# Patient Record
Sex: MALE | Race: WHITE | NOT HISPANIC OR LATINO | Employment: OTHER | URBAN - METROPOLITAN AREA
[De-identification: names, ages, dates, MRNs, and addresses within clinical notes are randomized per-mention and may not be internally consistent; named-entity substitution may affect disease eponyms.]

---

## 2018-05-16 ENCOUNTER — HOSPITAL ENCOUNTER (OUTPATIENT)
Dept: RADIOLOGY | Facility: MEDICAL CENTER | Age: 58
End: 2018-05-16
Attending: PHYSICIAN ASSISTANT
Payer: MEDICARE

## 2018-05-16 DIAGNOSIS — M54.6 PAIN IN THORACIC SPINE: ICD-10-CM

## 2018-05-16 DIAGNOSIS — M43.22 FUSION OF SPINE OF CERVICAL REGION: ICD-10-CM

## 2018-05-16 PROCEDURE — 72146 MRI CHEST SPINE W/O DYE: CPT

## 2018-07-19 ENCOUNTER — HOSPITAL ENCOUNTER (OUTPATIENT)
Facility: MEDICAL CENTER | Age: 58
End: 2018-07-19
Attending: NEUROLOGICAL SURGERY | Admitting: NEUROLOGICAL SURGERY
Payer: MEDICARE

## 2018-08-06 ENCOUNTER — HOSPITAL ENCOUNTER (OUTPATIENT)
Dept: RADIOLOGY | Facility: MEDICAL CENTER | Age: 58
End: 2018-08-06
Attending: NEUROLOGICAL SURGERY
Payer: MEDICARE

## 2018-08-06 VITALS — HEIGHT: 70 IN | WEIGHT: 234.57 LBS | BODY MASS INDEX: 33.58 KG/M2

## 2018-08-06 DIAGNOSIS — M51.36 DEGENERATION OF LUMBAR INTERVERTEBRAL DISC: ICD-10-CM

## 2018-08-06 DIAGNOSIS — Z01.810 PRE-OPERATIVE CARDIOVASCULAR EXAMINATION: ICD-10-CM

## 2018-08-06 DIAGNOSIS — Z01.812 PRE-OPERATIVE LABORATORY EXAMINATION: ICD-10-CM

## 2018-08-06 LAB
ANION GAP SERPL CALC-SCNC: 11 MMOL/L (ref 0–11.9)
APPEARANCE UR: CLEAR
APTT PPP: 24.8 SEC (ref 24.7–36)
BASOPHILS # BLD AUTO: 0.7 % (ref 0–1.8)
BASOPHILS # BLD: 0.04 K/UL (ref 0–0.12)
BILIRUB UR QL STRIP.AUTO: NEGATIVE
BUN SERPL-MCNC: 13 MG/DL (ref 8–22)
CALCIUM SERPL-MCNC: 9.7 MG/DL (ref 8.5–10.5)
CHLORIDE SERPL-SCNC: 105 MMOL/L (ref 96–112)
CO2 SERPL-SCNC: 26 MMOL/L (ref 20–33)
COLOR UR: YELLOW
CREAT SERPL-MCNC: 0.95 MG/DL (ref 0.5–1.4)
EKG IMPRESSION: NORMAL
EOSINOPHIL # BLD AUTO: 0.46 K/UL (ref 0–0.51)
EOSINOPHIL NFR BLD: 8.3 % (ref 0–6.9)
ERYTHROCYTE [DISTWIDTH] IN BLOOD BY AUTOMATED COUNT: 54.2 FL (ref 35.9–50)
EST. AVERAGE GLUCOSE BLD GHB EST-MCNC: 143 MG/DL
GLUCOSE SERPL-MCNC: 144 MG/DL (ref 65–99)
GLUCOSE UR STRIP.AUTO-MCNC: NEGATIVE MG/DL
HBA1C MFR BLD: 6.6 % (ref 0–5.6)
HCT VFR BLD AUTO: 42 % (ref 42–52)
HGB BLD-MCNC: 13.5 G/DL (ref 14–18)
IMM GRANULOCYTES # BLD AUTO: 0.01 K/UL (ref 0–0.11)
IMM GRANULOCYTES NFR BLD AUTO: 0.2 % (ref 0–0.9)
INR PPP: 0.98 (ref 0.87–1.13)
KETONES UR STRIP.AUTO-MCNC: NEGATIVE MG/DL
LEUKOCYTE ESTERASE UR QL STRIP.AUTO: NEGATIVE
LYMPHOCYTES # BLD AUTO: 1.51 K/UL (ref 1–4.8)
LYMPHOCYTES NFR BLD: 27.2 % (ref 22–41)
MCH RBC QN AUTO: 27.3 PG (ref 27–33)
MCHC RBC AUTO-ENTMCNC: 32.1 G/DL (ref 33.7–35.3)
MCV RBC AUTO: 84.8 FL (ref 81.4–97.8)
MICRO URNS: NORMAL
MONOCYTES # BLD AUTO: 0.53 K/UL (ref 0–0.85)
MONOCYTES NFR BLD AUTO: 9.5 % (ref 0–13.4)
NEUTROPHILS # BLD AUTO: 3.01 K/UL (ref 1.82–7.42)
NEUTROPHILS NFR BLD: 54.1 % (ref 44–72)
NITRITE UR QL STRIP.AUTO: NEGATIVE
NRBC # BLD AUTO: 0 K/UL
NRBC BLD-RTO: 0 /100 WBC
PH UR STRIP.AUTO: 6 [PH]
PLATELET # BLD AUTO: 188 K/UL (ref 164–446)
PMV BLD AUTO: 9.6 FL (ref 9–12.9)
POTASSIUM SERPL-SCNC: 4.6 MMOL/L (ref 3.6–5.5)
PROT UR QL STRIP: NEGATIVE MG/DL
PROTHROMBIN TIME: 12.7 SEC (ref 12–14.6)
RBC # BLD AUTO: 4.95 M/UL (ref 4.7–6.1)
RBC UR QL AUTO: NEGATIVE
SODIUM SERPL-SCNC: 142 MMOL/L (ref 135–145)
SP GR UR STRIP.AUTO: 1.02
UROBILINOGEN UR STRIP.AUTO-MCNC: 0.2 MG/DL
WBC # BLD AUTO: 5.6 K/UL (ref 4.8–10.8)

## 2018-08-06 PROCEDURE — 71046 X-RAY EXAM CHEST 2 VIEWS: CPT

## 2018-08-06 PROCEDURE — 85025 COMPLETE CBC W/AUTO DIFF WBC: CPT

## 2018-08-06 PROCEDURE — 85610 PROTHROMBIN TIME: CPT

## 2018-08-06 PROCEDURE — 83036 HEMOGLOBIN GLYCOSYLATED A1C: CPT

## 2018-08-06 PROCEDURE — 93005 ELECTROCARDIOGRAM TRACING: CPT

## 2018-08-06 PROCEDURE — 36415 COLL VENOUS BLD VENIPUNCTURE: CPT

## 2018-08-06 PROCEDURE — 85730 THROMBOPLASTIN TIME PARTIAL: CPT

## 2018-08-06 PROCEDURE — 72110 X-RAY EXAM L-2 SPINE 4/>VWS: CPT

## 2018-08-06 PROCEDURE — 80048 BASIC METABOLIC PNL TOTAL CA: CPT

## 2018-08-06 PROCEDURE — 81003 URINALYSIS AUTO W/O SCOPE: CPT

## 2018-08-06 PROCEDURE — 93010 ELECTROCARDIOGRAM REPORT: CPT | Performed by: INTERNAL MEDICINE

## 2019-08-05 ENCOUNTER — TELEPHONE (OUTPATIENT)
Dept: HEMATOLOGY ONCOLOGY | Facility: MEDICAL CENTER | Age: 59
End: 2019-08-05

## 2020-12-10 ENCOUNTER — HOSPITAL ENCOUNTER (OUTPATIENT)
Dept: LAB | Facility: MEDICAL CENTER | Age: 60
End: 2020-12-10
Attending: ANESTHESIOLOGY
Payer: MEDICARE

## 2020-12-10 LAB
COVID ORDER STATUS COVID19: NORMAL
SARS-COV-2 RNA RESP QL NAA+PROBE: NOTDETECTED
SPECIMEN SOURCE: NORMAL

## 2020-12-10 PROCEDURE — C9803 HOPD COVID-19 SPEC COLLECT: HCPCS

## 2020-12-10 PROCEDURE — U0003 INFECTIOUS AGENT DETECTION BY NUCLEIC ACID (DNA OR RNA); SEVERE ACUTE RESPIRATORY SYNDROME CORONAVIRUS 2 (SARS-COV-2) (CORONAVIRUS DISEASE [COVID-19]), AMPLIFIED PROBE TECHNIQUE, MAKING USE OF HIGH THROUGHPUT TECHNOLOGIES AS DESCRIBED BY CMS-2020-01-R: HCPCS

## 2021-03-15 DIAGNOSIS — Z23 NEED FOR VACCINATION: ICD-10-CM

## 2024-07-31 PROBLEM — R22.31 MASS OF RIGHT WRIST: Status: ACTIVE | Noted: 2024-07-31

## 2024-07-31 PROBLEM — M79.644 PAIN OF RIGHT THUMB: Status: ACTIVE | Noted: 2024-07-31

## 2024-10-18 PROBLEM — M75.101 ROTATOR CUFF TEAR, RIGHT: Status: ACTIVE | Noted: 2024-10-18

## 2024-11-12 PROBLEM — J45.909 ASTHMA: Status: ACTIVE | Noted: 2024-11-12

## 2025-07-15 ENCOUNTER — TELEPHONE (OUTPATIENT)
Dept: CARDIOLOGY | Facility: MEDICAL CENTER | Age: 65
End: 2025-07-15
Payer: MEDICARE

## 2025-07-15 NOTE — TELEPHONE ENCOUNTER
Called pt, no answer, left voicemail. Let Pt know of upcoming appointment and to call back if he has had any recent lab or cardiac imaging done.

## 2025-07-18 ENCOUNTER — OFFICE VISIT (OUTPATIENT)
Dept: CARDIOLOGY | Facility: MEDICAL CENTER | Age: 65
End: 2025-07-18
Attending: INTERNAL MEDICINE
Payer: MEDICARE

## 2025-07-18 VITALS
RESPIRATION RATE: 20 BRPM | WEIGHT: 181 LBS | BODY MASS INDEX: 25.91 KG/M2 | SYSTOLIC BLOOD PRESSURE: 110 MMHG | HEIGHT: 70 IN | DIASTOLIC BLOOD PRESSURE: 70 MMHG | HEART RATE: 60 BPM | OXYGEN SATURATION: 96 %

## 2025-07-18 DIAGNOSIS — I10 ESSENTIAL HYPERTENSION: ICD-10-CM

## 2025-07-18 DIAGNOSIS — I77.810 THORACIC AORTIC ECTASIA (HCC): ICD-10-CM

## 2025-07-18 DIAGNOSIS — I78.0 HHT (HEREDITARY HEMORRHAGIC TELANGIECTASIA) (HCC): ICD-10-CM

## 2025-07-18 DIAGNOSIS — R07.9 CHEST PAIN, UNSPECIFIED TYPE: ICD-10-CM

## 2025-07-18 DIAGNOSIS — R73.01 IMPAIRED FASTING GLUCOSE: ICD-10-CM

## 2025-07-18 DIAGNOSIS — E78.5 DYSLIPIDEMIA: Primary | ICD-10-CM

## 2025-07-18 LAB — EKG IMPRESSION: NORMAL

## 2025-07-18 PROCEDURE — 3074F SYST BP LT 130 MM HG: CPT | Performed by: INTERNAL MEDICINE

## 2025-07-18 PROCEDURE — 99204 OFFICE O/P NEW MOD 45 MIN: CPT | Performed by: INTERNAL MEDICINE

## 2025-07-18 PROCEDURE — G2211 COMPLEX E/M VISIT ADD ON: HCPCS | Performed by: INTERNAL MEDICINE

## 2025-07-18 PROCEDURE — 99213 OFFICE O/P EST LOW 20 MIN: CPT | Performed by: INTERNAL MEDICINE

## 2025-07-18 PROCEDURE — 93010 ELECTROCARDIOGRAM REPORT: CPT | Performed by: INTERNAL MEDICINE

## 2025-07-18 PROCEDURE — 93005 ELECTROCARDIOGRAM TRACING: CPT | Mod: TC | Performed by: INTERNAL MEDICINE

## 2025-07-18 PROCEDURE — 3078F DIAST BP <80 MM HG: CPT | Performed by: INTERNAL MEDICINE

## 2025-07-18 RX ORDER — DEXTROAMPHETAMINE SACCHARATE, AMPHETAMINE ASPARTATE MONOHYDRATE, DEXTROAMPHETAMINE SULFATE AND AMPHETAMINE SULFATE 7.5; 7.5; 7.5; 7.5 MG/1; MG/1; MG/1; MG/1
30 CAPSULE, EXTENDED RELEASE ORAL EVERY MORNING
COMMUNITY

## 2025-07-18 ASSESSMENT — ENCOUNTER SYMPTOMS
LOSS OF CONSCIOUSNESS: 0
MEMORY LOSS: 1
ABDOMINAL PAIN: 0
FEVER: 0
NECK PAIN: 1
SHORTNESS OF BREATH: 1
ORTHOPNEA: 0
DIZZINESS: 1
NERVOUS/ANXIOUS: 0
PND: 0
BACK PAIN: 1
DEPRESSION: 0
BRUISES/BLEEDS EASILY: 0
MYALGIAS: 0
COUGH: 0
CONSTIPATION: 1
WEIGHT LOSS: 1
PALPITATIONS: 0
NAUSEA: 1
INSOMNIA: 1
CHILLS: 0

## 2025-07-18 ASSESSMENT — FIBROSIS 4 INDEX: FIB4 SCORE: 1.65

## 2025-07-18 NOTE — PROGRESS NOTES
Admission Date / Service Date: 25    Patient's PCP: NEGAR Campbell.      HPI: Stacy Toscano is a 65 y.o. male with atypical chest pain, bilateral pulmonary emboli 2021 related to Covid Pneumonia and right lower extremity DVT 2023 now on Eliquis, heterozygous factor V Leiden deficiency, angiographically normal coronary arteries on cardiac catheterization 2009, ascending aortic dilatation measuring 4.0 cm on echocardiogram (2019), hypertension, type 2 diabetes, dyslipidemia, ADHD maintained on Adderall, hereditary hemorrhagic telangiectasia, ABHISHEK on CPAP, asthma, chronic neck pain, history of pulmonary embolism  after surgery, gout and obesity who is here for evaluation of his cardiac status.    He overall is doing fairly well.    He has lost approximately 70 pounds total.  He lost 40 pounds from having COVID.    He saw a  who recommended he had an echocardiogram with contrast as well as bubble study to evaluate for AVMs and any evidence of hemorrhagic telangiectasia.    He has not been having any chest pain or pressure.  He denies shortness of breath at rest.  He has some dyspnea on exertion which is stable.  He denies any orthopnea, PND, or lower extremity edema.  No syncope or near syncope.  No palpitations.    He has not been exercising regularly due to back pain and shoulder pain.    He is teaching part-time.  He is also taking care of his mother who has metastatic cancer and dementia.    Cardiology Results:  EK2025: Reviewed by me shows normal sinus rhythm.  Low voltage limb leads.  Anterior infarct age undetermined.    Echo 10/11/2023: Somewhat technically difficult study due to off axis views. Normal left ventricular size, thickness, and systolic function with EF 60 to 65%. Grade 1 diastolic dysfunction. Ascending aorta upper limits at 3.8 cm. No significant valvular abnormalities. RV is mildly dilated with preserved systolic function. RVSP 31  "mmHg.     Stress Test: MPI (Lexiscan MPI 6/30/2017: Negative for ischemia or infarction. Small fixed mid inferior wall defect most likely due to diaphragmatic attenuation. EF 67% with normal wall motion.) - 6/30/2017     Current list of administered Medications:  Current Medications[1]    Past Medical History[2]    Past Surgical History[3]    Family History   Problem Relation Age of Onset    Alcohol/Drug Mother         alcohol    Diabetes Maternal Grandmother     Heart Disease Maternal Grandmother 80    Heart Disease Maternal Grandfather     Arthritis Paternal Grandfather         gout         Allergies[4]    Review of systems:  Review of Systems   Constitutional:  Positive for malaise/fatigue and weight loss. Negative for chills and fever.   HENT:  Positive for hearing loss, nosebleeds and tinnitus. Negative for congestion.    Respiratory:  Positive for shortness of breath. Negative for cough.    Cardiovascular:  Negative for chest pain, palpitations, orthopnea, leg swelling and PND.   Gastrointestinal:  Positive for constipation and nausea. Negative for abdominal pain.   Genitourinary:  Positive for frequency and urgency.   Musculoskeletal:  Positive for back pain and neck pain. Negative for myalgias.   Skin:  Negative for rash.   Neurological:  Positive for dizziness. Negative for loss of consciousness.   Endo/Heme/Allergies:  Positive for environmental allergies. Does not bruise/bleed easily.   Psychiatric/Behavioral:  Positive for memory loss. Negative for depression. The patient has insomnia. The patient is not nervous/anxious.        Physical exam:  Vitals:    07/18/25 0824   BP: 110/70   BP Location: Left arm   Patient Position: Sitting   BP Cuff Size: Adult   Pulse: 60   Resp: 20   SpO2: 96%   Weight: 82.1 kg (181 lb)   Height: 1.778 m (5' 10\")        General: No acute distress. Well nourished.  HEENT: Normocephalic.  Atraumatic.  EOM grossly intact, no scleral icterus.  Neck:  No JVD, no bruits  CVS: RRR. " Normal S1, S2. No murmurs, gallops, or rubs.   Resp: CTAB. No wheezing or crackles/rhonchi. Normal respiratory effort.  Abdomen: Soft, NT, no robert hepatomegaly.  MSK/Ext: No clubbing or cyanosis. No edema.  Skin: Warm and dry, no rashes.  Neurological: CN III-XII grossly intact. No focal deficits.   Psych: A&O x 3, appropriate affect, good judgement    Data:  Laboratory studies:    June 16, 2025: CMP normal except for glucose 149 and calcium 8.7.  CBC normal.  Hemoglobin A1c 5.3.    He reports having blood work in May and his cholesterol was well-controlled.  Unfortunately I do not have any records of this.  He will try to send copies to me.    December 9, 2024: CBC unremarkable.  CMP normal.  Hemoglobin A1c 5.3.      Assessment :  1. Dyslipidemia  EKG    CT-CARDIAC SCORING      2. Thoracic aortic ectasia (HCC)        3. Chest pain, unspecified type  EKG      4. Essential hypertension        5. HHT (hereditary hemorrhagic telangiectasia) (HCC)  EC-ECHOCARDIOGRAM COMPLETE W/ CONT      6. Impaired fasting glucose             Plan:  I will continue the patient's current medications.  I will obtain an echocardiogram with contrast and with bubble study to evaluate his HHT and to rule out pulmonary AVMs.  I will obtain a coronary calcium CT scan to evaluate for CAD.  We discussed low-fat, low carbohydrate diet.  I instructed him to work with his psychologist to try to wean off Adderall.  He says he takes it as needed 3 or 4 times a week.  We discussed exercise.  He will return to clinic in 6 months or sooner if he has any problems.  He gets blood work monitored regularly through his PCPs office.    Return in about 6 months (around 1/18/2026).     Thank you for allowing me to participate in this patient's care.  Please do not hesitate to contact me with questions or concerns.    Merlyn Sy MD, Located within [facility]  Cardiologist   [facility]    Please note that this dictation was created using voice  recognition software. I have made every reasonable attempt to correct obvious errors, but it is possible there are errors of grammar and possibly content that I did not discover before finalizing the note.         [1]   Current Outpatient Medications:     amphetamine-dextroamphetamine ER (ADDERALL XR, 30MG,) 30 MG XR capsule, Take 30 mg by mouth every morning., Disp: , Rfl:     TESTOSTERONE BU, Place  into mouth between cheek and gum. INJ 0.5 ML EVERY TWO WEEKS, Disp: , Rfl:     methocarbamol (ROBAXIN) 750 MG Tab, Take 1 Tablet by mouth 3 times a day as needed (muscle spasm)., Disp: 90 Tablet, Rfl: 2    apixaban (ELIQUIS) 5mg Tab, one tablet Orally twice per day, Disp: , Rfl:     albuterol (PROAIR HFA) 108 (90 Base) MCG/ACT Aero Soln inhalation aerosol, 1 puff as needed Inhalation every 4 hrs for 25, Disp: , Rfl:     losartan (COZAAR) 25 MG Tab, 1 tablet Oral Once a day for 90 days, Disp: , Rfl:     atorvastatin (LIPITOR) 20 MG Tab, Take 20 mg by mouth every day., Disp: , Rfl:     Cholecalciferol 45114 UNIT Cap, , Disp: , Rfl:     JARDIANCE 25 MG Tab, , Disp: , Rfl:     nitroglycerin (NITROSTAT) 0.4 MG SL Tab, , Disp: , Rfl:     MOUNJARO 5 MG/0.5ML Solution Pen-injector, Inject 5 mg under the skin., Disp: , Rfl:     loratadine (CLARITIN) 10 MG Tab, Take 10 mg by mouth every day., Disp: , Rfl:     budesonide-formoterol (SYMBICORT) 80-4.5 MCG/ACT Aerosol, Inhale 2 Puffs by mouth every day. (Patient not taking: Reported on 7/18/2025), Disp: , Rfl:   [2]   Past Medical History:  Diagnosis Date    Asthma     daily inhaler    Quinonez's esophagus     Chest pain, unspecified     Chronic back pain     Depression 3/2014    mild not on any meds not an issue at this time    Diabetes     oral medication    Fatty liver     GERD (gastroesophageal reflux disease)     GOUT     Heterozygous factor V Leiden mutation (HCC)     Hiatus hernia syndrome     repaired    Hypertension     pt states well controlled on meds states sometimes it  runs too low prophylatic for kidneys    Indigestion     occas    MEDICAL HOME     Musculoskeletal disorder of the posterior cervical     Myofascial pain syndrome 2/2/11    back,neck ,and foot pain 7/10    Personal history of venous thrombosis and embolism 7/2010    PE    Pneumonia     6/18 pt denies ever having pneumonia    Reflux esophagitis     Colin's syndrome     misdiagnosis    Shortness of breath     Sleep apnea     Snoring     Unspecified hemorrhagic conditions     Factor V   [3]   Past Surgical History:  Procedure Laterality Date    PB SHLDR ARTHROSCOP,SURG,W/ROTAT CUFF REPB Right 11/12/2024    Procedure: RIGHT SHOULDER ARTHRPSCOPY ROTATOR CUFF REPAIR, OPEN SUBPECTORAL BICEPS TENODESIS;  Surgeon: Alvaro Laughlin M.D.;  Location: [facility];  Service: Orthopedics    ND SHLDR ARTHROSCOP,PART ACROMIOPLAS Right 11/12/2024    Procedure: RIGHT SUBACROMIAL DECOMPRESSION;  Surgeon: Alvaro Laughlin M.D.;  Location: [facility];  Service: Orthopedics    ND LDR ARTHROSCOP PART DEBRIDE 1-2 Right 11/12/2024    Procedure: RIGHT LABRAL DEBRIDEMENT, REPAIRS AS INDICATED;  Surgeon: Alvaro Laughlin M.D.;  Location: [facility];  Service: Orthopedics    FUSION, SPINE, LUMBAR, PLIF  11/12/2014    Performed by Alessio Medina M.D. at SURGERY [facility]    S I JOINT FUSION  3/6/2014    Performed by Alessio Medina M.D. at SURGERY [facility]    CERVICAL FUSION POSTERIOR  1/15/2014    Performed by Alessio Medina M.D. at SURGERY [facility]    CERVICAL FUSION POSTERIOR  1/18/13    C7-T1 by Dr. Rodriguez    CERVICAL DISK AND FUSION ANTERIOR  10/14/2011    Performed by ANGEL RODRIGUEZ at SURGERY [facility]    HARDWARE REMOVAL NEURO  10/14/2011    Performed by ANGEL RODRIGUEZ at SURGERY [facility]    PB INJ CERV/THORAC,W/WO CNTRST  9/9/2011    Performed by FRANCIE MELCHOR at SURGERY [facility]    ND NJX AA&/STRD TFRML EPI  CERVICAL/THORACIC EA ADDL  9/9/2011    Performed by FRANCIE MELCHOR at SUR[facility]ERY [facility]    FUSION, SPINE, LUMBAR, PLIF  2/4/2011    Performed by ANGEL RODRIGUEZ at SURGERY [facility]    LUMBAR LAMINECTOMY DISKECTOMY  2/4/2011    Performed by ANGEL RODRIGUEZ at SURGERY [facility]    FUSION, SPINE, LUMBAR, PLIF  12/10/2010    Performed by ANGEL RODRIGUEZ at SURGERY [facility]    LUMBAR LAMINECTOMY DISKECTOMY  12/10/2010    Performed by ANGEL RODRIGUEZ at SURGERY [facility]    CARPAL TUNNEL ENDOSCOPIC  4/10    right    FOOT SURGERY  3/10    left    ANGIOGRAM  2009    normal    DOC BY LAPAROSCOPY  2006    CARPAL TUNNEL ENDOSCOPIC  3/10, 4/11    left    CERVICAL FUSION POSTERIOR      C3-C6    FOOT SURGERY      Seven surgeries for neuromas and toe joint replacement    FUNDOPLICATON      LUMBAR LAMINECTOMY DISKECTOMY      L4-L5    UVULOPHARYNGOPALATOPLASTY     [4]   Allergies  Allergen Reactions    Peg-Kcl-Nacl-Nasulf-Na Asc-C      Other Reaction(s): Nausea/Vomiting (sensitivity)    Nkda [No Known Drug Allergy]

## 2025-08-04 ENCOUNTER — HOSPITAL ENCOUNTER (OUTPATIENT)
Dept: RADIOLOGY | Facility: MEDICAL CENTER | Age: 65
End: 2025-08-04
Attending: INTERNAL MEDICINE
Payer: COMMERCIAL

## 2025-08-04 ENCOUNTER — PATIENT MESSAGE (OUTPATIENT)
Dept: CARDIOLOGY | Facility: MEDICAL CENTER | Age: 65
End: 2025-08-04
Payer: MEDICARE

## 2025-08-04 PROCEDURE — 4410556 CT-CARDIAC SCORING (SELF PAY ONLY)

## 2025-08-05 ENCOUNTER — RESULTS FOLLOW-UP (OUTPATIENT)
Dept: CARDIOLOGY | Facility: MEDICAL CENTER | Age: 65
End: 2025-08-05
Payer: MEDICARE

## 2025-08-25 ENCOUNTER — HOSPITAL ENCOUNTER (OUTPATIENT)
Dept: CARDIOLOGY | Facility: MEDICAL CENTER | Age: 65
End: 2025-08-25
Attending: INTERNAL MEDICINE
Payer: MEDICARE

## 2025-08-25 DIAGNOSIS — I78.0 HHT (HEREDITARY HEMORRHAGIC TELANGIECTASIA) (HCC): ICD-10-CM

## 2025-08-25 LAB
LV EJECT FRACT MOD 2C 99903: 67.05
LV EJECT FRACT MOD 4C 99902: 70.85
LV EJECT FRACT MOD BP 99901: 69.93

## 2025-08-25 PROCEDURE — 700117 HCHG RX CONTRAST REV CODE 255: Performed by: INTERNAL MEDICINE

## 2025-08-25 PROCEDURE — 93306 TTE W/DOPPLER COMPLETE: CPT | Mod: 26 | Performed by: INTERNAL MEDICINE

## 2025-08-25 PROCEDURE — 93306 TTE W/DOPPLER COMPLETE: CPT

## 2025-08-25 RX ADMIN — HUMAN ALBUMIN MICROSPHERES AND PERFLUTREN 3 ML: 10; .22 INJECTION, SOLUTION INTRAVENOUS at 17:15

## 2025-08-26 ENCOUNTER — TELEPHONE (OUTPATIENT)
Dept: CARDIOLOGY | Facility: MEDICAL CENTER | Age: 65
End: 2025-08-26
Payer: MEDICARE